# Patient Record
Sex: MALE | Race: WHITE | URBAN - METROPOLITAN AREA
[De-identification: names, ages, dates, MRNs, and addresses within clinical notes are randomized per-mention and may not be internally consistent; named-entity substitution may affect disease eponyms.]

---

## 2020-12-02 ENCOUNTER — NEW PATIENT (OUTPATIENT)
Dept: URBAN - METROPOLITAN AREA CLINIC 51 | Facility: CLINIC | Age: 85
End: 2020-12-02
Payer: MEDICARE

## 2020-12-02 DIAGNOSIS — H02.035 SENILE ENTROPION OF LEFT LOWER LID: ICD-10-CM

## 2020-12-02 DIAGNOSIS — H02.032 SENILE ENTROPION OF RIGHT LOWER LID: ICD-10-CM

## 2020-12-02 PROCEDURE — 92134 CPTRZ OPH DX IMG PST SGM RTA: CPT | Performed by: OPTOMETRIST

## 2020-12-02 PROCEDURE — 92014 COMPRE OPH EXAM EST PT 1/>: CPT | Performed by: OPTOMETRIST

## 2020-12-02 PROCEDURE — 92004 COMPRE OPH EXAM NEW PT 1/>: CPT | Performed by: OPTOMETRIST

## 2020-12-02 ASSESSMENT — VISUAL ACUITY
OS: 20/30
OD: 20/25

## 2020-12-02 ASSESSMENT — INTRAOCULAR PRESSURE
OD: 17
OS: 14

## 2020-12-02 ASSESSMENT — KERATOMETRY
OS: 42.00
OD: 41.82

## 2021-01-11 ENCOUNTER — NEW PATIENT (OUTPATIENT)
Dept: URBAN - METROPOLITAN AREA CLINIC 51 | Facility: CLINIC | Age: 86
End: 2021-01-11
Payer: MEDICARE

## 2021-01-11 DIAGNOSIS — D48.1 NEOPLASM OF UNCERTIAN BEHAVIOR OF EYELID: ICD-10-CM

## 2021-01-11 PROCEDURE — 68801 DILATE TEAR DUCT OPENING: CPT | Performed by: OPHTHALMOLOGY

## 2021-01-11 PROCEDURE — 99203 OFFICE O/P NEW LOW 30 MIN: CPT | Performed by: OPHTHALMOLOGY

## 2021-01-11 PROCEDURE — 92285 EXTERNAL OCULAR PHOTOGRAPHY: CPT | Performed by: OPHTHALMOLOGY

## 2021-01-11 PROCEDURE — 68840 EXPLORE/IRRIGATE TEAR DUCTS: CPT | Performed by: OPHTHALMOLOGY

## 2021-01-28 ENCOUNTER — ADULT PHYSICAL (OUTPATIENT)
Dept: URBAN - METROPOLITAN AREA CLINIC 51 | Facility: CLINIC | Age: 86
End: 2021-01-28
Payer: MEDICARE

## 2021-01-28 PROCEDURE — 99203 OFFICE O/P NEW LOW 30 MIN: CPT | Performed by: PHYSICIAN ASSISTANT

## 2021-02-05 ENCOUNTER — SURGERY (OUTPATIENT)
Dept: URBAN - METROPOLITAN AREA SURGERY 19 | Facility: SURGERY | Age: 86
End: 2021-02-05
Payer: MEDICARE

## 2021-02-05 PROCEDURE — 68320 REVISE/GRAFT EYELID LINING: CPT | Performed by: OPHTHALMOLOGY

## 2021-02-18 ENCOUNTER — POST OP (OUTPATIENT)
Dept: URBAN - METROPOLITAN AREA CLINIC 51 | Facility: CLINIC | Age: 86
End: 2021-02-18
Payer: MEDICARE

## 2021-02-18 DIAGNOSIS — Z48.817 ENCOUNTER FOR SURGICAL AFTERCARE FOLLOWING SURGERY ON THE SKIN AND SUBCUTANEOUS TISSUE: Primary | ICD-10-CM

## 2021-02-18 PROCEDURE — 99024 POSTOP FOLLOW-UP VISIT: CPT | Performed by: OPTOMETRIST

## 2021-03-24 ENCOUNTER — FOLLOW UP ESTABLISHED (OUTPATIENT)
Dept: URBAN - METROPOLITAN AREA CLINIC 51 | Facility: CLINIC | Age: 86
End: 2021-03-24
Payer: MEDICARE

## 2021-03-24 DIAGNOSIS — H04.123 TEAR FILM INSUFFICIENCY OF BILATERAL LACRIMAL GLANDS: ICD-10-CM

## 2021-03-24 DIAGNOSIS — H02.052 TRICHIASIS WITHOUT ENTROPION, RIGHT LOWER LID: ICD-10-CM

## 2021-03-24 DIAGNOSIS — H02.055 TRICHIASIS WITHOUT ENTROPION, LEFT LOWER LID: ICD-10-CM

## 2021-03-24 DIAGNOSIS — H35.371 PUCKERING OF MACULA, RIGHT EYE: Primary | ICD-10-CM

## 2021-03-24 PROCEDURE — 92014 COMPRE OPH EXAM EST PT 1/>: CPT | Performed by: OPTOMETRIST

## 2021-03-24 PROCEDURE — 92134 CPTRZ OPH DX IMG PST SGM RTA: CPT | Performed by: OPTOMETRIST

## 2021-03-24 PROCEDURE — 67820 REVISE EYELASHES: CPT | Performed by: OPTOMETRIST

## 2021-03-24 ASSESSMENT — INTRAOCULAR PRESSURE
OD: 16
OS: 15

## 2021-03-24 ASSESSMENT — VISUAL ACUITY
OS: 20/30
OD: 20/40

## 2021-03-24 ASSESSMENT — KERATOMETRY
OD: 41.85
OS: 41.98

## 2021-05-13 ENCOUNTER — TESTING ONLY (OUTPATIENT)
Dept: URBAN - METROPOLITAN AREA CLINIC 51 | Facility: CLINIC | Age: 86
End: 2021-05-13
Payer: MEDICARE

## 2021-05-13 DIAGNOSIS — H25.13 AGE-RELATED NUCLEAR CATARACT, BILATERAL: ICD-10-CM

## 2021-05-13 PROCEDURE — 92136 OPHTHALMIC BIOMETRY: CPT | Performed by: OPHTHALMOLOGY

## 2021-05-13 PROCEDURE — 92025 CPTRIZED CORNEAL TOPOGRAPHY: CPT | Performed by: OPHTHALMOLOGY

## 2021-05-13 PROCEDURE — 99213 OFFICE O/P EST LOW 20 MIN: CPT | Performed by: PHYSICIAN ASSISTANT

## 2021-05-19 ENCOUNTER — PRE-OPERATIVE VISIT (OUTPATIENT)
Dept: URBAN - METROPOLITAN AREA CLINIC 44 | Facility: CLINIC | Age: 86
End: 2021-05-19
Payer: MEDICARE

## 2021-05-19 DIAGNOSIS — H25.12 AGE-RELATED NUCLEAR CATARACT, LEFT EYE: Primary | ICD-10-CM

## 2021-05-19 DIAGNOSIS — H52.223 REGULAR ASTIGMATISM, BILATERAL: ICD-10-CM

## 2021-05-19 PROCEDURE — 99214 OFFICE O/P EST MOD 30 MIN: CPT | Performed by: OPHTHALMOLOGY

## 2021-05-19 NOTE — IMPRESSION/PLAN
Impression: Age-related nuclear cataract, left eye: H25.12. Plan: Discussed cataract diagnosis with the patient. Discussed and reviewed treatment options for cataracts. Surgical treatment is required for cataracts. Risks and benefits of surgical treatment were discussed and understood. Patient elects surgical treatment. Patient understands the need for glasses post-op. Recommend surgery OU,OS first. STD LENS DISTANCE AIM , ORA, PLAN LRI, REVIEWED JEANNIE. Discussed limitations to vision post op due to DRY EYES, ERM OD. If first eye doing well, ok to proceed with second eye surgery. .. DEXYCU ONLY PER DR. GOODE

## 2021-05-26 ENCOUNTER — SURGERY (OUTPATIENT)
Dept: URBAN - METROPOLITAN AREA SURGERY 19 | Facility: SURGERY | Age: 86
End: 2021-05-26
Payer: MEDICARE

## 2021-05-26 PROCEDURE — 66984 XCAPSL CTRC RMVL W/O ECP: CPT | Performed by: OPHTHALMOLOGY

## 2021-05-27 ENCOUNTER — POST-OPERATIVE VISIT (OUTPATIENT)
Dept: URBAN - METROPOLITAN AREA CLINIC 51 | Facility: CLINIC | Age: 86
End: 2021-05-27
Payer: MEDICARE

## 2021-05-27 PROCEDURE — 99024 POSTOP FOLLOW-UP VISIT: CPT | Performed by: OPTOMETRIST

## 2021-05-27 ASSESSMENT — INTRAOCULAR PRESSURE: OS: 17

## 2021-06-02 ENCOUNTER — POST-OPERATIVE VISIT (OUTPATIENT)
Dept: URBAN - METROPOLITAN AREA CLINIC 51 | Facility: CLINIC | Age: 86
End: 2021-06-02
Payer: MEDICARE

## 2021-06-02 DIAGNOSIS — H25.11 AGE-RELATED NUCLEAR CATARACT, RIGHT EYE: ICD-10-CM

## 2021-06-02 DIAGNOSIS — Z01.818 ENCOUNTER FOR OTHER PREPROCEDURAL EXAMINATION: Primary | ICD-10-CM

## 2021-06-02 PROCEDURE — 99213 OFFICE O/P EST LOW 20 MIN: CPT | Performed by: PHYSICIAN ASSISTANT

## 2021-06-02 PROCEDURE — 99024 POSTOP FOLLOW-UP VISIT: CPT | Performed by: OPTOMETRIST

## 2021-06-02 ASSESSMENT — VISUAL ACUITY
OD: 20/25
OS: 20/20

## 2021-06-02 ASSESSMENT — INTRAOCULAR PRESSURE
OS: 14
OD: 14
OS: 14
OD: 14

## 2021-06-02 NOTE — IMPRESSION/PLAN
Impression: S/P Cataract Extraction by phacoemulsification with IOL placement; Astigmatic Keratotomy; ORA OS - 7 Days. Encounter for surgical aftercare following surgery on a sense organ  Z48.810. Excellent post op course   Condition is improving - Plan: Discussed with pt healing is progressing as expected. Use Artificial tears qid. Vision will continue to improve and may fluctuate. [id number] Benita Braxton for surgery on second eye. --Advised patient to use artificial tears for comfort.

## 2021-06-15 ENCOUNTER — SURGERY (OUTPATIENT)
Dept: URBAN - METROPOLITAN AREA SURGERY 19 | Facility: SURGERY | Age: 86
End: 2021-06-15
Payer: MEDICARE

## 2021-06-15 PROCEDURE — 66984 XCAPSL CTRC RMVL W/O ECP: CPT | Performed by: OPHTHALMOLOGY

## 2021-06-16 ENCOUNTER — POST-OPERATIVE VISIT (OUTPATIENT)
Dept: URBAN - METROPOLITAN AREA CLINIC 51 | Facility: CLINIC | Age: 86
End: 2021-06-16
Payer: MEDICARE

## 2021-06-16 PROCEDURE — 99024 POSTOP FOLLOW-UP VISIT: CPT | Performed by: OPTOMETRIST

## 2021-06-16 ASSESSMENT — INTRAOCULAR PRESSURE: OD: 15

## 2021-06-16 NOTE — IMPRESSION/PLAN
Impression: S/P Cataract Extraction by phacoemulsification with IOL placement; Astigmatic Keratotomy; ORA OD - 1 Day. Encounter for surgical aftercare following surgery on a sense organ  Z48.810.  Plan: expected surgical post op examination,  vision will fluctuate, ,vision will continue to improve , may be light sensitive,  Artificial tears qid minimum

## 2021-07-01 ENCOUNTER — POST-OPERATIVE VISIT (OUTPATIENT)
Dept: URBAN - METROPOLITAN AREA CLINIC 51 | Facility: CLINIC | Age: 86
End: 2021-07-01
Payer: MEDICARE

## 2021-07-01 DIAGNOSIS — Z48.810 ENCOUNTER FOR SURGICAL AFTERCARE FOLLOWING SURGERY ON A SENSE ORGAN: Primary | ICD-10-CM

## 2021-07-01 PROCEDURE — 99024 POSTOP FOLLOW-UP VISIT: CPT | Performed by: OPTOMETRIST

## 2021-07-01 PROCEDURE — 92134 CPTRZ OPH DX IMG PST SGM RTA: CPT | Performed by: OPTOMETRIST

## 2021-07-01 ASSESSMENT — INTRAOCULAR PRESSURE
OD: 13
OS: 13

## 2021-07-01 ASSESSMENT — VISUAL ACUITY
OS: 20/30
OD: 20/25

## 2021-07-01 NOTE — IMPRESSION/PLAN
Impression: S/P Cataract Extraction by phacoemulsification with IOL placement; Astigmatic Keratotomy; ORA OD - 16 Days. Encounter for surgical aftercare following surgery on a sense organ  Z48.810. Plan: will need yag OS --Advised patient to use artificial tears for comfort.

## 2022-08-16 ENCOUNTER — OFFICE VISIT (OUTPATIENT)
Dept: URBAN - METROPOLITAN AREA CLINIC 51 | Facility: CLINIC | Age: 87
End: 2022-08-16
Payer: MEDICARE

## 2022-08-16 DIAGNOSIS — H35.371 PUCKERING OF MACULA, RIGHT EYE: Primary | ICD-10-CM

## 2022-08-16 DIAGNOSIS — H26.492 OTHER SECONDARY CATARACT, LEFT EYE: ICD-10-CM

## 2022-08-16 DIAGNOSIS — H02.052 TRICHIASIS WITHOUT ENTROPIAN RIGHT LOWER EYELID: ICD-10-CM

## 2022-08-16 DIAGNOSIS — H04.123 TEAR FILM INSUFFICIENCY OF BILATERAL LACRIMAL GLANDS: ICD-10-CM

## 2022-08-16 PROCEDURE — 92014 COMPRE OPH EXAM EST PT 1/>: CPT | Performed by: OPTOMETRIST

## 2022-08-16 PROCEDURE — 92134 CPTRZ OPH DX IMG PST SGM RTA: CPT | Performed by: OPTOMETRIST

## 2022-08-16 PROCEDURE — 67820 REVISE EYELASHES: CPT | Performed by: OPTOMETRIST

## 2022-08-16 ASSESSMENT — INTRAOCULAR PRESSURE
OS: 14
OD: 13

## 2022-08-16 ASSESSMENT — VISUAL ACUITY
OD: 20/20
OS: 20/20

## 2022-08-16 NOTE — IMPRESSION/PLAN
Impression: Other secondary cataract, left eye: H26.492. Plan: mild, will continue to monitor at this time.

## 2022-08-16 NOTE — IMPRESSION/PLAN
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  Plan: removed lashes today, pt can RTC anytime for removal. 
use Ointment as needed and ATS

## 2022-08-16 NOTE — IMPRESSION/PLAN
Impression: Tear film insufficiency of bilateral lacrimal glands Plan: AT qid OU 
akash to lid to soften lashes

## 2023-09-14 ENCOUNTER — OFFICE VISIT (OUTPATIENT)
Dept: URBAN - METROPOLITAN AREA CLINIC 51 | Facility: CLINIC | Age: 88
End: 2023-09-14
Payer: MEDICARE

## 2023-09-14 DIAGNOSIS — H35.371 PUCKERING OF MACULA, RIGHT EYE: ICD-10-CM

## 2023-09-14 DIAGNOSIS — H02.052 TRICHIASIS WITHOUT ENTROPIAN RIGHT LOWER EYELID: Primary | ICD-10-CM

## 2023-09-14 DIAGNOSIS — H26.492 OTHER SECONDARY CATARACT, LEFT EYE: ICD-10-CM

## 2023-09-14 DIAGNOSIS — H04.123 TEAR FILM INSUFFICIENCY OF BILATERAL LACRIMAL GLANDS: ICD-10-CM

## 2023-09-14 PROCEDURE — 99214 OFFICE O/P EST MOD 30 MIN: CPT | Performed by: OPTOMETRIST

## 2023-09-14 PROCEDURE — 92134 CPTRZ OPH DX IMG PST SGM RTA: CPT | Performed by: OPTOMETRIST

## 2023-09-14 PROCEDURE — 67820 REVISE EYELASHES: CPT | Performed by: OPTOMETRIST

## 2023-09-14 ASSESSMENT — VISUAL ACUITY
OS: 20/20
OD: 20/20

## 2023-09-14 ASSESSMENT — INTRAOCULAR PRESSURE
OS: 13
OD: 13

## 2023-09-14 ASSESSMENT — KERATOMETRY
OS: 41.88
OD: 42.00

## 2024-09-17 ENCOUNTER — OFFICE VISIT (OUTPATIENT)
Dept: URBAN - METROPOLITAN AREA CLINIC 51 | Facility: CLINIC | Age: 89
End: 2024-09-17
Payer: MEDICARE

## 2024-09-17 DIAGNOSIS — H02.052 TRICHIASIS WITHOUT ENTROPIAN RIGHT LOWER EYELID: Primary | ICD-10-CM

## 2024-09-17 DIAGNOSIS — H04.123 TEAR FILM INSUFFICIENCY OF BILATERAL LACRIMAL GLANDS: ICD-10-CM

## 2024-09-17 DIAGNOSIS — H26.493 OTHER SECONDARY CATARACT, BILATERAL: ICD-10-CM

## 2024-09-17 DIAGNOSIS — H35.371 PUCKERING OF MACULA, RIGHT EYE: ICD-10-CM

## 2024-09-17 PROCEDURE — 99214 OFFICE O/P EST MOD 30 MIN: CPT | Performed by: OPTOMETRIST

## 2024-09-17 PROCEDURE — 92134 CPTRZ OPH DX IMG PST SGM RTA: CPT | Performed by: OPTOMETRIST

## 2024-09-17 PROCEDURE — 67820 REVISE EYELASHES: CPT | Performed by: OPTOMETRIST

## 2024-09-17 ASSESSMENT — INTRAOCULAR PRESSURE
OD: 9
OS: 10

## 2024-09-17 ASSESSMENT — VISUAL ACUITY
OD: 20/20
OS: 20/20

## 2024-09-17 ASSESSMENT — KERATOMETRY
OS: 42.00
OD: 42.00